# Patient Record
Sex: MALE | Race: WHITE | Employment: FULL TIME | URBAN - METROPOLITAN AREA
[De-identification: names, ages, dates, MRNs, and addresses within clinical notes are randomized per-mention and may not be internally consistent; named-entity substitution may affect disease eponyms.]

---

## 2018-12-07 ENCOUNTER — APPOINTMENT (OUTPATIENT)
Dept: GENERAL RADIOLOGY | Age: 68
End: 2018-12-07
Payer: COMMERCIAL

## 2018-12-07 ENCOUNTER — TELEPHONE (OUTPATIENT)
Dept: ORTHOPEDIC SURGERY | Age: 68
End: 2018-12-07

## 2018-12-07 ENCOUNTER — HOSPITAL ENCOUNTER (EMERGENCY)
Age: 68
Discharge: HOME | End: 2018-12-07
Attending: EMERGENCY MEDICINE
Payer: COMMERCIAL

## 2018-12-07 VITALS
SYSTOLIC BLOOD PRESSURE: 144 MMHG | WEIGHT: 257.72 LBS | TEMPERATURE: 97.4 F | HEART RATE: 71 BPM | BODY MASS INDEX: 36.08 KG/M2 | DIASTOLIC BLOOD PRESSURE: 84 MMHG | OXYGEN SATURATION: 100 % | HEIGHT: 71 IN | RESPIRATION RATE: 16 BRPM

## 2018-12-07 DIAGNOSIS — S86.812A RUPTURE OF LEFT PATELLAR TENDON, INITIAL ENCOUNTER: Primary | ICD-10-CM

## 2018-12-07 LAB
ANION GAP SERPL CALCULATED.3IONS-SCNC: 17 MMOL/L (ref 3–16)
BUN BLDV-MCNC: 9 MG/DL (ref 7–20)
CALCIUM SERPL-MCNC: 9 MG/DL (ref 8.3–10.6)
CHLORIDE BLD-SCNC: 100 MMOL/L (ref 99–110)
CO2: 23 MMOL/L (ref 21–32)
CREAT SERPL-MCNC: 0.9 MG/DL (ref 0.8–1.3)
GFR AFRICAN AMERICAN: >60
GFR NON-AFRICAN AMERICAN: >60
GLUCOSE BLD-MCNC: 120 MG/DL (ref 70–99)
HCT VFR BLD CALC: 45.1 % (ref 40.5–52.5)
HEMOGLOBIN: 15.9 G/DL (ref 13.5–17.5)
INR BLD: 1.09 (ref 0.86–1.14)
MCH RBC QN AUTO: 33.1 PG (ref 26–34)
MCHC RBC AUTO-ENTMCNC: 35.2 G/DL (ref 31–36)
MCV RBC AUTO: 94.2 FL (ref 80–100)
PDW BLD-RTO: 13 % (ref 12.4–15.4)
PLATELET # BLD: 159 K/UL (ref 135–450)
PMV BLD AUTO: 7.8 FL (ref 5–10.5)
POTASSIUM SERPL-SCNC: 3.3 MMOL/L (ref 3.5–5.1)
PROTHROMBIN TIME: 12.4 SEC (ref 9.8–13)
RBC # BLD: 4.79 M/UL (ref 4.2–5.9)
SODIUM BLD-SCNC: 140 MMOL/L (ref 136–145)
WBC # BLD: 5.3 K/UL (ref 4–11)

## 2018-12-07 PROCEDURE — 85027 COMPLETE CBC AUTOMATED: CPT

## 2018-12-07 PROCEDURE — 99283 EMERGENCY DEPT VISIT LOW MDM: CPT

## 2018-12-07 PROCEDURE — 73562 X-RAY EXAM OF KNEE 3: CPT

## 2018-12-07 PROCEDURE — 6360000002 HC RX W HCPCS

## 2018-12-07 PROCEDURE — 85610 PROTHROMBIN TIME: CPT

## 2018-12-07 PROCEDURE — 96374 THER/PROPH/DIAG INJ IV PUSH: CPT

## 2018-12-07 PROCEDURE — 80048 BASIC METABOLIC PNL TOTAL CA: CPT

## 2018-12-07 RX ORDER — MORPHINE SULFATE 4 MG/ML
4 INJECTION, SOLUTION INTRAMUSCULAR; INTRAVENOUS ONCE
Status: COMPLETED | OUTPATIENT
Start: 2018-12-07 | End: 2018-12-07

## 2018-12-07 RX ORDER — METAXALONE 400 MG/1
400 TABLET ORAL 2 TIMES DAILY
Qty: 20 TABLET | Refills: 0 | Status: SHIPPED | OUTPATIENT
Start: 2018-12-07

## 2018-12-07 RX ORDER — HYDROCODONE BITARTRATE AND ACETAMINOPHEN 5; 325 MG/1; MG/1
1 TABLET ORAL EVERY 6 HOURS PRN
Qty: 15 TABLET | Refills: 0 | Status: SHIPPED | OUTPATIENT
Start: 2018-12-07 | End: 2018-12-12

## 2018-12-07 RX ORDER — MORPHINE SULFATE 4 MG/ML
INJECTION, SOLUTION INTRAMUSCULAR; INTRAVENOUS
Status: COMPLETED
Start: 2018-12-07 | End: 2018-12-07

## 2018-12-07 RX ADMIN — MORPHINE SULFATE 4 MG: 4 INJECTION, SOLUTION INTRAMUSCULAR; INTRAVENOUS at 13:19

## 2018-12-07 RX ADMIN — MORPHINE SULFATE 4 MG: 4 INJECTION INTRAVENOUS at 13:19

## 2018-12-07 ASSESSMENT — PAIN SCALES - GENERAL
PAINLEVEL_OUTOF10: 5

## 2018-12-07 ASSESSMENT — ENCOUNTER SYMPTOMS
ABDOMINAL PAIN: 0
CHOKING: 0
EYE DISCHARGE: 0
NAUSEA: 0
APNEA: 0
VOMITING: 0
BACK PAIN: 0
EYE REDNESS: 0
FACIAL SWELLING: 0
SHORTNESS OF BREATH: 0

## 2018-12-07 NOTE — ED PROVIDER NOTES
systems were reviewed and negative. PAST MEDICAL HISTORY     Past Medical History:   Diagnosis Date    Hypertension     Thyroid disease          SURGICAL HISTORY   History reviewed. No pertinent surgical history. CURRENTMEDICATIONS       Previous Medications    No medications on file         ALLERGIES     Ampicillin and Bactrim [sulfamethoxazole-trimethoprim]    FAMILYHISTORY     History reviewed. No pertinent family history. SOCIAL HISTORY       Social History     Social History    Marital status:      Spouse name: N/A    Number of children: N/A    Years of education: N/A     Social History Main Topics    Smoking status: Never Smoker    Smokeless tobacco: None    Alcohol use Yes      Comment: socially    Drug use: Unknown    Sexual activity: Not Asked     Other Topics Concern    None     Social History Narrative    None       SCREENINGS             PHYSICAL EXAM    (up to 7 for level 4, 8 or more for level 5)     ED Triage Vitals [12/07/18 1249]   BP Temp Temp src Pulse Resp SpO2 Height Weight   137/79 97.4 °F (36.3 °C) -- 62 16 100 % 5' 11\" (1.803 m) 257 lb 11.5 oz (116.9 kg)       Physical Exam   Constitutional: He is oriented to person, place, and time. He appears well-developed and well-nourished. HENT:   Head: Normocephalic and atraumatic. Nose: Nose normal.   Eyes: Right eye exhibits no discharge. Left eye exhibits no discharge. Neck: Normal range of motion. Neck supple. No spinous process tenderness and no muscular tenderness present. Cardiovascular: Normal rate, regular rhythm and normal heart sounds. Exam reveals no gallop and no friction rub. No murmur heard. Pulmonary/Chest: Effort normal and breath sounds normal. No respiratory distress. He has no wheezes. He has no rales. He exhibits no tenderness. Abdominal: Soft. Bowel sounds are normal. He exhibits no distension and no mass. There is no tenderness. There is no rebound and no guarding. No hernia.

## 2018-12-11 ENCOUNTER — OFFICE VISIT (OUTPATIENT)
Dept: ORTHOPEDIC SURGERY | Age: 68
End: 2018-12-11
Payer: COMMERCIAL

## 2018-12-11 VITALS — BODY MASS INDEX: 35.7 KG/M2 | HEIGHT: 71 IN | WEIGHT: 255 LBS | RESPIRATION RATE: 16 BRPM

## 2018-12-11 DIAGNOSIS — S86.892A AVULSION OF LEFT PATELLAR TENDON, INITIAL ENCOUNTER: Primary | ICD-10-CM

## 2018-12-11 PROCEDURE — 99203 OFFICE O/P NEW LOW 30 MIN: CPT | Performed by: ORTHOPAEDIC SURGERY

## 2018-12-11 RX ORDER — LEVOTHYROXINE SODIUM 0.12 MG/1
TABLET ORAL
COMMUNITY
Start: 2018-08-01

## 2018-12-11 RX ORDER — LISINOPRIL AND HYDROCHLOROTHIAZIDE 25; 20 MG/1; MG/1
TABLET ORAL
COMMUNITY
Start: 2018-08-01

## 2018-12-11 RX ORDER — ATORVASTATIN CALCIUM 20 MG/1
20 TABLET, FILM COATED ORAL
COMMUNITY
Start: 2018-10-04

## 2018-12-11 RX ORDER — METHOCARBAMOL 750 MG/1
TABLET, FILM COATED ORAL
COMMUNITY
Start: 2018-12-07 | End: 2018-12-12

## 2018-12-11 NOTE — LETTER
All orders without checkboxes will processed automatically unless crossed out. Orders with checkboxes MUST be checked in order to be carried out. Sixto Jha                                                        1950  Date of Procedure/Surgery: 12/13/18  1. H & P for ALL procedure/surgery completed within 30 days, to be updated day of procedure/surgery  2. [ ]Consults: PT/OT evaluation  3. [X ]Defer to Anesthesia for Pre-Admission testing orders  4. Diagnostic Tests:  [ ]EKG (if not completed in last 3 months) IF: (check all that apply)   Other:  [ Jina Poisson (if not completed in last 6 months) IF: (check all that apply)   Hx Malignancy   Hx CVS/Thoracic Surg   CVS Disease   Hx Pneumonia last 3 months   Chronic Pulm Disease  Other:  [ ]Radiology   Knee Right Left Standing AP knee, hip to ankle on scoliosis film   Other:  5. Labs  [ ]Basic Metabolic Profile  IF: (check all that apply)  [ ]Albumin    Other:     __________________________________________________________________  [ ]CBC without diff   Pre op exam      __________________________________________________________________  [ ]PT/INR  PTT   Pre op exam         __________________________________________________________________  [ ]Type & Screen   Surg with potiential for signif.  blood loss  [ ]Type & cross match 2 units         __________________________________________________________________  [ ]Urine routine reflex to culture (UAR) If cultures positive, repeat on                  admission [ Orlena Current catch urine  [ ]Nasal culture for MRSA   [ ]Nasal MRSA culture  __________________________________________________________________  6. [ ]Other:      TO: ___________________________________________ Date: ____________ Time: _________    Physician Signature:            12/11/2018 10:42 AM                   Pre-operative Physician Prophylaxis Orders      Sixto Jha  1950    All orders without checkboxes will be processed automatically unless

## 2018-12-12 ENCOUNTER — ANESTHESIA EVENT (OUTPATIENT)
Dept: OPERATING ROOM | Age: 68
End: 2018-12-12
Payer: COMMERCIAL

## 2018-12-13 ENCOUNTER — HOSPITAL ENCOUNTER (OUTPATIENT)
Age: 68
Setting detail: OUTPATIENT SURGERY
Discharge: HOME | End: 2018-12-13
Attending: ORTHOPAEDIC SURGERY | Admitting: ORTHOPAEDIC SURGERY
Payer: COMMERCIAL

## 2018-12-13 ENCOUNTER — ANESTHESIA (OUTPATIENT)
Dept: OPERATING ROOM | Age: 68
End: 2018-12-13
Payer: COMMERCIAL

## 2018-12-13 VITALS
RESPIRATION RATE: 18 BRPM | SYSTOLIC BLOOD PRESSURE: 147 MMHG | BODY MASS INDEX: 35.7 KG/M2 | HEART RATE: 80 BPM | TEMPERATURE: 96.8 F | OXYGEN SATURATION: 95 % | HEIGHT: 71 IN | WEIGHT: 255 LBS | DIASTOLIC BLOOD PRESSURE: 83 MMHG

## 2018-12-13 VITALS
SYSTOLIC BLOOD PRESSURE: 129 MMHG | OXYGEN SATURATION: 98 % | TEMPERATURE: 96.8 F | RESPIRATION RATE: 10 BRPM | DIASTOLIC BLOOD PRESSURE: 73 MMHG

## 2018-12-13 DIAGNOSIS — S86.812A RUPTURED PATELLAR TENDON, LEFT, INITIAL ENCOUNTER: Primary | ICD-10-CM

## 2018-12-13 PROCEDURE — 7100000010 HC PHASE II RECOVERY - FIRST 15 MIN: Performed by: ORTHOPAEDIC SURGERY

## 2018-12-13 PROCEDURE — 6360000002 HC RX W HCPCS: Performed by: ANESTHESIOLOGY

## 2018-12-13 PROCEDURE — 3700000000 HC ANESTHESIA ATTENDED CARE: Performed by: ORTHOPAEDIC SURGERY

## 2018-12-13 PROCEDURE — 7100000000 HC PACU RECOVERY - FIRST 15 MIN: Performed by: ORTHOPAEDIC SURGERY

## 2018-12-13 PROCEDURE — 2580000003 HC RX 258: Performed by: ORTHOPAEDIC SURGERY

## 2018-12-13 PROCEDURE — 27380 REPAIR OF KNEECAP TENDON: CPT | Performed by: ORTHOPAEDIC SURGERY

## 2018-12-13 PROCEDURE — 2720000010 HC SURG SUPPLY STERILE: Performed by: ORTHOPAEDIC SURGERY

## 2018-12-13 PROCEDURE — 2500000003 HC RX 250 WO HCPCS: Performed by: NURSE ANESTHETIST, CERTIFIED REGISTERED

## 2018-12-13 PROCEDURE — 6360000002 HC RX W HCPCS: Performed by: NURSE ANESTHETIST, CERTIFIED REGISTERED

## 2018-12-13 PROCEDURE — 2709999900 HC NON-CHARGEABLE SUPPLY: Performed by: ORTHOPAEDIC SURGERY

## 2018-12-13 PROCEDURE — 2500000003 HC RX 250 WO HCPCS: Performed by: ORTHOPAEDIC SURGERY

## 2018-12-13 PROCEDURE — 2580000003 HC RX 258: Performed by: ANESTHESIOLOGY

## 2018-12-13 PROCEDURE — 7100000001 HC PACU RECOVERY - ADDTL 15 MIN: Performed by: ORTHOPAEDIC SURGERY

## 2018-12-13 PROCEDURE — 6370000000 HC RX 637 (ALT 250 FOR IP): Performed by: ANESTHESIOLOGY

## 2018-12-13 PROCEDURE — 3600000015 HC SURGERY LEVEL 5 ADDTL 15MIN: Performed by: ORTHOPAEDIC SURGERY

## 2018-12-13 PROCEDURE — 3700000001 HC ADD 15 MINUTES (ANESTHESIA): Performed by: ORTHOPAEDIC SURGERY

## 2018-12-13 PROCEDURE — 3600000005 HC SURGERY LEVEL 5 BASE: Performed by: ORTHOPAEDIC SURGERY

## 2018-12-13 PROCEDURE — 7100000011 HC PHASE II RECOVERY - ADDTL 15 MIN: Performed by: ORTHOPAEDIC SURGERY

## 2018-12-13 RX ORDER — ONDANSETRON 2 MG/ML
INJECTION INTRAMUSCULAR; INTRAVENOUS PRN
Status: DISCONTINUED | OUTPATIENT
Start: 2018-12-13 | End: 2018-12-13 | Stop reason: SDUPTHER

## 2018-12-13 RX ORDER — SODIUM CHLORIDE 0.9 % (FLUSH) 0.9 %
10 SYRINGE (ML) INJECTION EVERY 12 HOURS SCHEDULED
Status: DISCONTINUED | OUTPATIENT
Start: 2018-12-13 | End: 2018-12-13 | Stop reason: HOSPADM

## 2018-12-13 RX ORDER — BUPIVACAINE HYDROCHLORIDE AND EPINEPHRINE 2.5; 5 MG/ML; UG/ML
INJECTION, SOLUTION INFILTRATION; PERINEURAL
Status: COMPLETED | OUTPATIENT
Start: 2018-12-13 | End: 2018-12-13

## 2018-12-13 RX ORDER — DEXAMETHASONE SODIUM PHOSPHATE 4 MG/ML
INJECTION, SOLUTION INTRA-ARTICULAR; INTRALESIONAL; INTRAMUSCULAR; INTRAVENOUS; SOFT TISSUE PRN
Status: DISCONTINUED | OUTPATIENT
Start: 2018-12-13 | End: 2018-12-13 | Stop reason: SDUPTHER

## 2018-12-13 RX ORDER — MIDAZOLAM HYDROCHLORIDE 1 MG/ML
INJECTION INTRAMUSCULAR; INTRAVENOUS PRN
Status: DISCONTINUED | OUTPATIENT
Start: 2018-12-13 | End: 2018-12-13 | Stop reason: SDUPTHER

## 2018-12-13 RX ORDER — FENTANYL CITRATE 50 UG/ML
INJECTION, SOLUTION INTRAMUSCULAR; INTRAVENOUS PRN
Status: DISCONTINUED | OUTPATIENT
Start: 2018-12-13 | End: 2018-12-13 | Stop reason: SDUPTHER

## 2018-12-13 RX ORDER — SODIUM CHLORIDE 0.9 % (FLUSH) 0.9 %
10 SYRINGE (ML) INJECTION PRN
Status: DISCONTINUED | OUTPATIENT
Start: 2018-12-13 | End: 2018-12-13 | Stop reason: HOSPADM

## 2018-12-13 RX ORDER — MAGNESIUM HYDROXIDE 1200 MG/15ML
LIQUID ORAL CONTINUOUS PRN
Status: DISCONTINUED | OUTPATIENT
Start: 2018-12-13 | End: 2018-12-13 | Stop reason: HOSPADM

## 2018-12-13 RX ORDER — CLINDAMYCIN PHOSPHATE 900 MG/50ML
900 INJECTION INTRAVENOUS
Status: COMPLETED | OUTPATIENT
Start: 2018-12-13 | End: 2018-12-13

## 2018-12-13 RX ORDER — FENTANYL CITRATE 50 UG/ML
25 INJECTION, SOLUTION INTRAMUSCULAR; INTRAVENOUS EVERY 5 MIN PRN
Status: DISCONTINUED | OUTPATIENT
Start: 2018-12-13 | End: 2018-12-13 | Stop reason: HOSPADM

## 2018-12-13 RX ORDER — LIDOCAINE HYDROCHLORIDE 20 MG/ML
INJECTION, SOLUTION INFILTRATION; PERINEURAL PRN
Status: DISCONTINUED | OUTPATIENT
Start: 2018-12-13 | End: 2018-12-13 | Stop reason: SDUPTHER

## 2018-12-13 RX ORDER — KETOROLAC TROMETHAMINE 30 MG/ML
INJECTION, SOLUTION INTRAMUSCULAR; INTRAVENOUS PRN
Status: DISCONTINUED | OUTPATIENT
Start: 2018-12-13 | End: 2018-12-13 | Stop reason: SDUPTHER

## 2018-12-13 RX ORDER — FENTANYL CITRATE 50 UG/ML
50 INJECTION, SOLUTION INTRAMUSCULAR; INTRAVENOUS EVERY 5 MIN PRN
Status: DISCONTINUED | OUTPATIENT
Start: 2018-12-13 | End: 2018-12-13 | Stop reason: HOSPADM

## 2018-12-13 RX ORDER — ROCURONIUM BROMIDE 10 MG/ML
INJECTION, SOLUTION INTRAVENOUS PRN
Status: DISCONTINUED | OUTPATIENT
Start: 2018-12-13 | End: 2018-12-13 | Stop reason: SDUPTHER

## 2018-12-13 RX ORDER — OXYCODONE HYDROCHLORIDE AND ACETAMINOPHEN 5; 325 MG/1; MG/1
1 TABLET ORAL ONCE
Status: COMPLETED | OUTPATIENT
Start: 2018-12-13 | End: 2018-12-13

## 2018-12-13 RX ORDER — PROMETHAZINE HYDROCHLORIDE 25 MG/ML
6.25 INJECTION, SOLUTION INTRAMUSCULAR; INTRAVENOUS
Status: DISCONTINUED | OUTPATIENT
Start: 2018-12-13 | End: 2018-12-13 | Stop reason: HOSPADM

## 2018-12-13 RX ORDER — SODIUM CHLORIDE 9 MG/ML
INJECTION, SOLUTION INTRAVENOUS CONTINUOUS
Status: DISCONTINUED | OUTPATIENT
Start: 2018-12-13 | End: 2018-12-13 | Stop reason: HOSPADM

## 2018-12-13 RX ORDER — OXYCODONE HYDROCHLORIDE AND ACETAMINOPHEN 5; 325 MG/1; MG/1
1 TABLET ORAL EVERY 6 HOURS PRN
Qty: 28 TABLET | Refills: 0 | Status: SHIPPED | OUTPATIENT
Start: 2018-12-13 | End: 2018-12-20

## 2018-12-13 RX ORDER — PROPOFOL 10 MG/ML
INJECTION, EMULSION INTRAVENOUS PRN
Status: DISCONTINUED | OUTPATIENT
Start: 2018-12-13 | End: 2018-12-13 | Stop reason: SDUPTHER

## 2018-12-13 RX ORDER — ONDANSETRON 2 MG/ML
4 INJECTION INTRAMUSCULAR; INTRAVENOUS
Status: DISCONTINUED | OUTPATIENT
Start: 2018-12-13 | End: 2018-12-13 | Stop reason: HOSPADM

## 2018-12-13 RX ADMIN — PROPOFOL 200 MG: 10 INJECTION, EMULSION INTRAVENOUS at 07:31

## 2018-12-13 RX ADMIN — FENTANYL CITRATE 50 MCG: 50 INJECTION, SOLUTION INTRAMUSCULAR; INTRAVENOUS at 09:08

## 2018-12-13 RX ADMIN — FENTANYL CITRATE 50 MCG: 50 INJECTION, SOLUTION INTRAMUSCULAR; INTRAVENOUS at 09:00

## 2018-12-13 RX ADMIN — SUGAMMADEX 200 MG: 100 INJECTION, SOLUTION INTRAVENOUS at 08:45

## 2018-12-13 RX ADMIN — KETOROLAC TROMETHAMINE 15 MG: 30 INJECTION, SOLUTION INTRAMUSCULAR at 08:32

## 2018-12-13 RX ADMIN — DEXAMETHASONE SODIUM PHOSPHATE 10 MG: 4 INJECTION, SOLUTION INTRAMUSCULAR; INTRAVENOUS at 07:37

## 2018-12-13 RX ADMIN — PROPOFOL 30 MG: 10 INJECTION, EMULSION INTRAVENOUS at 08:41

## 2018-12-13 RX ADMIN — OXYCODONE AND ACETAMINOPHEN 1 TABLET: 5; 325 TABLET ORAL at 10:14

## 2018-12-13 RX ADMIN — FENTANYL CITRATE 100 MCG: 50 INJECTION INTRAMUSCULAR; INTRAVENOUS at 07:29

## 2018-12-13 RX ADMIN — CLINDAMYCIN PHOSPHATE 900 MG: 18 INJECTION, SOLUTION INTRAMUSCULAR; INTRAVENOUS at 07:22

## 2018-12-13 RX ADMIN — FENTANYL CITRATE 25 MCG: 50 INJECTION INTRAMUSCULAR; INTRAVENOUS at 08:42

## 2018-12-13 RX ADMIN — ROCURONIUM BROMIDE 50 MG: 10 INJECTION INTRAVENOUS at 07:31

## 2018-12-13 RX ADMIN — FENTANYL CITRATE 25 MCG: 50 INJECTION, SOLUTION INTRAMUSCULAR; INTRAVENOUS at 09:15

## 2018-12-13 RX ADMIN — FENTANYL CITRATE 25 MCG: 50 INJECTION INTRAMUSCULAR; INTRAVENOUS at 08:50

## 2018-12-13 RX ADMIN — SODIUM CHLORIDE: 9 INJECTION, SOLUTION INTRAVENOUS at 06:45

## 2018-12-13 RX ADMIN — FENTANYL CITRATE 50 MCG: 50 INJECTION INTRAMUSCULAR; INTRAVENOUS at 08:00

## 2018-12-13 RX ADMIN — LIDOCAINE HYDROCHLORIDE 100 MG: 20 INJECTION, SOLUTION INFILTRATION; PERINEURAL at 07:31

## 2018-12-13 RX ADMIN — MIDAZOLAM 2 MG: 1 INJECTION INTRAMUSCULAR; INTRAVENOUS at 07:22

## 2018-12-13 RX ADMIN — ONDANSETRON 4 MG: 2 INJECTION INTRAMUSCULAR; INTRAVENOUS at 08:32

## 2018-12-13 ASSESSMENT — PULMONARY FUNCTION TESTS
PIF_VALUE: 18
PIF_VALUE: 17
PIF_VALUE: 13
PIF_VALUE: 18
PIF_VALUE: 18
PIF_VALUE: 5
PIF_VALUE: 13
PIF_VALUE: 1
PIF_VALUE: 18
PIF_VALUE: 18
PIF_VALUE: 10
PIF_VALUE: 17
PIF_VALUE: 7
PIF_VALUE: 18
PIF_VALUE: 1
PIF_VALUE: 13
PIF_VALUE: 1
PIF_VALUE: 18
PIF_VALUE: 26
PIF_VALUE: 21
PIF_VALUE: 18
PIF_VALUE: 17
PIF_VALUE: 21
PIF_VALUE: 18
PIF_VALUE: 1
PIF_VALUE: 17
PIF_VALUE: 18
PIF_VALUE: 13
PIF_VALUE: 18
PIF_VALUE: 17
PIF_VALUE: 18
PIF_VALUE: 18
PIF_VALUE: 1
PIF_VALUE: 17
PIF_VALUE: 3
PIF_VALUE: 5
PIF_VALUE: 18
PIF_VALUE: 18
PIF_VALUE: 17
PIF_VALUE: 17
PIF_VALUE: 1
PIF_VALUE: 18
PIF_VALUE: 18
PIF_VALUE: 17
PIF_VALUE: 3
PIF_VALUE: 18
PIF_VALUE: 18
PIF_VALUE: 1
PIF_VALUE: 18
PIF_VALUE: 17
PIF_VALUE: 17
PIF_VALUE: 0
PIF_VALUE: 4
PIF_VALUE: 18
PIF_VALUE: 18
PIF_VALUE: 17
PIF_VALUE: 1
PIF_VALUE: 18
PIF_VALUE: 17
PIF_VALUE: 18
PIF_VALUE: 17
PIF_VALUE: 13
PIF_VALUE: 1
PIF_VALUE: 19
PIF_VALUE: 18
PIF_VALUE: 1
PIF_VALUE: 17
PIF_VALUE: 13
PIF_VALUE: 0
PIF_VALUE: 22
PIF_VALUE: 18
PIF_VALUE: 13
PIF_VALUE: 13
PIF_VALUE: 18
PIF_VALUE: 5
PIF_VALUE: 17
PIF_VALUE: 17
PIF_VALUE: 4
PIF_VALUE: 17
PIF_VALUE: 18
PIF_VALUE: 17
PIF_VALUE: 18
PIF_VALUE: 8
PIF_VALUE: 17
PIF_VALUE: 18
PIF_VALUE: 7
PIF_VALUE: 18

## 2018-12-13 ASSESSMENT — PAIN SCALES - GENERAL
PAINLEVEL_OUTOF10: 5
PAINLEVEL_OUTOF10: 8

## 2018-12-13 ASSESSMENT — PAIN - FUNCTIONAL ASSESSMENT: PAIN_FUNCTIONAL_ASSESSMENT: 0-10

## 2018-12-13 ASSESSMENT — ENCOUNTER SYMPTOMS: SHORTNESS OF BREATH: 0

## 2018-12-13 NOTE — OP NOTE
[address] Sharron Gallego                                 OPERATIVE REPORT    PATIENT NAME: Lynda Fernández                      :        1950  MED REC NO:   [id number]                          ROOM:  ACCOUNT NO:   [de-identified]                           ADMIT DATE: 2018  PROVIDER:     Mahesh Benites MD    DATE OF PROCEDURE:  2018    PREOPERATIVE DIAGNOSIS:  Left patellar tendon rupture. POSTOPERATIVE DIAGNOSIS:  Left patellar tendon rupture. OPERATION PERFORMED:  Primary repair of the left patellar tendon. SURGEON:  Mahesh Benites MD    ASSISTANT:  Kenney Ndiaye. TOURNIQUET TIME:  36 minutes at 250 mmHg. BLOOD LOSS:  Minimal.    COMPLICATIONS:  None. INDICATIONS:  The patient is a 53-year-old male who had a fall and  ruptured his left patellar tendon. He had complete destruction of his  extensor mechanism. I recommended surgical treatment. Please refer to  my preoperative note for full details of our discussion. OPERATIVE PROCEDURE:  The patient was brought back to the OR and  transferred onto operating room table. General anesthesia was  administered, and a nonsterile thigh tourniquet was applied. The entire  left lower extremity was subsequently prepped and draped in the usual  sterile fashion. A full time-out was performed with all parties in  agreement, and the patient did receive clindamycin 900 mg for  preoperative IV antibiotics. The left lower extremity was exsanguinated and the tourniquet was then  inflated to 250 mmHg. I made a midline knee incision and elevated  medial and lateral skin flaps. There was immediate expression of  clotted hematoma consistent with his rupture. The retinaculum medially  and laterally were torn as well. This was more of an intrasubstance  tear than a direct avulsion completely off the inferior pole of the  patella.   The remaining tissue of the

## 2018-12-13 NOTE — ANESTHESIA PRE PROCEDURE
Substance Use Topics    Smoking status: Former Smoker     Quit date: 12/12/1983    Smokeless tobacco: Never Used    Alcohol use Yes      Comment: socially                                Counseling given: Not Answered      Vital Signs (Current):   Vitals:    12/13/18 0628   BP: (!) 144/94   Pulse: 89   Resp: 16   Temp: 98 °F (36.7 °C)   TempSrc: Oral   SpO2: 98%   Weight: 255 lb (115.7 kg)   Height: 5' 11\" (1.803 m)                                              BP Readings from Last 3 Encounters:   12/13/18 (!) 144/94   12/07/18 (!) 144/84       NPO Status: Time of last liquid consumption: 1900                        Time of last solid consumption: 1900                        Date of last liquid consumption: 12/12/18                        Date of last solid food consumption: 12/12/18    BMI:   Wt Readings from Last 3 Encounters:   12/13/18 255 lb (115.7 kg)   12/11/18 255 lb (115.7 kg)   12/07/18 257 lb 11.5 oz (116.9 kg)     Body mass index is 35.57 kg/m². CBC:   Lab Results   Component Value Date    WBC 5.3 12/07/2018    RBC 4.79 12/07/2018    HGB 15.9 12/07/2018    HCT 45.1 12/07/2018    MCV 94.2 12/07/2018    RDW 13.0 12/07/2018     12/07/2018       CMP:   Lab Results   Component Value Date     12/07/2018    K 3.3 12/07/2018     12/07/2018    CO2 23 12/07/2018    BUN 9 12/07/2018    CREATININE 0.9 12/07/2018    GFRAA >60 12/07/2018    LABGLOM >60 12/07/2018    GLUCOSE 120 12/07/2018    CALCIUM 9.0 12/07/2018       POC Tests: No results for input(s): POCGLU, POCNA, POCK, POCCL, POCBUN, POCHEMO, POCHCT in the last 72 hours.     Coags:   Lab Results   Component Value Date    PROTIME 12.4 12/07/2018    INR 1.09 12/07/2018       HCG (If Applicable): No results found for: PREGTESTUR, PREGSERUM, HCG, HCGQUANT     ABGs: No results found for: PHART, PO2ART, UQC5PLJ, JEN7DHY, BEART, K0HBIWJJ     Type & Screen (If Applicable):  No results found for: LABABO, [address]    Anesthesia Evaluation  Patient

## 2018-12-27 ENCOUNTER — OFFICE VISIT (OUTPATIENT)
Dept: ORTHOPEDIC SURGERY | Age: 68
End: 2018-12-27
Payer: COMMERCIAL

## 2018-12-27 VITALS
WEIGHT: 243 LBS | HEIGHT: 71 IN | DIASTOLIC BLOOD PRESSURE: 84 MMHG | BODY MASS INDEX: 34.02 KG/M2 | SYSTOLIC BLOOD PRESSURE: 116 MMHG | HEART RATE: 91 BPM

## 2018-12-27 DIAGNOSIS — S86.812A RUPTURED PATELLAR TENDON, LEFT, INITIAL ENCOUNTER: Primary | ICD-10-CM

## 2018-12-27 PROCEDURE — L1832 KO ADJ JNT POS R SUP PRE CST: HCPCS | Performed by: ORTHOPAEDIC SURGERY

## 2018-12-27 PROCEDURE — 99024 POSTOP FOLLOW-UP VISIT: CPT | Performed by: ORTHOPAEDIC SURGERY

## 2018-12-28 ENCOUNTER — TELEPHONE (OUTPATIENT)
Dept: ORTHOPEDIC SURGERY | Age: 68
End: 2018-12-28

## 2019-01-07 ENCOUNTER — TELEPHONE (OUTPATIENT)
Dept: ORTHOPEDIC SURGERY | Age: 69
End: 2019-01-07

## 2019-01-09 ENCOUNTER — TELEPHONE (OUTPATIENT)
Dept: ORTHOPEDIC SURGERY | Age: 69
End: 2019-01-09

## 2019-01-11 ENCOUNTER — TELEPHONE (OUTPATIENT)
Dept: ORTHOPEDIC SURGERY | Age: 69
End: 2019-01-11

## 2019-01-29 ENCOUNTER — OFFICE VISIT (OUTPATIENT)
Dept: ORTHOPEDIC SURGERY | Age: 69
End: 2019-01-29

## 2019-01-29 VITALS — RESPIRATION RATE: 16 BRPM | BODY MASS INDEX: 34.02 KG/M2 | HEIGHT: 71 IN | WEIGHT: 243 LBS

## 2019-01-29 DIAGNOSIS — S86.812A RUPTURED PATELLAR TENDON, LEFT, INITIAL ENCOUNTER: Primary | ICD-10-CM

## 2019-01-29 PROCEDURE — 99024 POSTOP FOLLOW-UP VISIT: CPT | Performed by: ORTHOPAEDIC SURGERY

## 2019-02-04 ENCOUNTER — HOSPITAL ENCOUNTER (OUTPATIENT)
Dept: PHYSICAL THERAPY | Age: 69
Setting detail: THERAPIES SERIES
Discharge: HOME | End: 2019-02-04
Payer: COMMERCIAL

## 2019-02-04 PROCEDURE — 97530 THERAPEUTIC ACTIVITIES: CPT

## 2019-02-04 PROCEDURE — 97161 PT EVAL LOW COMPLEX 20 MIN: CPT

## 2019-02-04 PROCEDURE — 97110 THERAPEUTIC EXERCISES: CPT

## 2019-02-04 ASSESSMENT — PAIN DESCRIPTION - LOCATION: LOCATION: KNEE

## 2019-02-04 ASSESSMENT — PAIN DESCRIPTION - ORIENTATION: ORIENTATION: LEFT

## 2019-02-04 ASSESSMENT — PAIN DESCRIPTION - DESCRIPTORS: DESCRIPTORS: ACHING

## 2019-02-04 ASSESSMENT — PAIN - FUNCTIONAL ASSESSMENT: PAIN_FUNCTIONAL_ASSESSMENT: PREVENTS OR INTERFERES SOME ACTIVE ACTIVITIES AND ADLS

## 2019-02-07 ENCOUNTER — HOSPITAL ENCOUNTER (OUTPATIENT)
Dept: PHYSICAL THERAPY | Age: 69
Setting detail: THERAPIES SERIES
Discharge: HOME | End: 2019-02-07
Payer: COMMERCIAL

## 2019-02-07 PROCEDURE — 97110 THERAPEUTIC EXERCISES: CPT

## 2019-02-11 ENCOUNTER — HOSPITAL ENCOUNTER (OUTPATIENT)
Dept: PHYSICAL THERAPY | Age: 69
Setting detail: THERAPIES SERIES
Discharge: HOME | End: 2019-02-11
Payer: COMMERCIAL

## 2019-02-11 PROCEDURE — 97110 THERAPEUTIC EXERCISES: CPT

## 2019-02-11 PROCEDURE — G0283 ELEC STIM OTHER THAN WOUND: HCPCS

## 2019-02-12 ENCOUNTER — TELEPHONE (OUTPATIENT)
Dept: ORTHOPEDIC SURGERY | Age: 69
End: 2019-02-12

## 2019-02-14 ENCOUNTER — HOSPITAL ENCOUNTER (OUTPATIENT)
Dept: PHYSICAL THERAPY | Age: 69
Setting detail: THERAPIES SERIES
Discharge: HOME | End: 2019-02-14
Payer: COMMERCIAL

## 2019-02-14 PROCEDURE — 97112 NEUROMUSCULAR REEDUCATION: CPT

## 2019-02-14 PROCEDURE — 97110 THERAPEUTIC EXERCISES: CPT

## 2019-02-15 ENCOUNTER — TELEPHONE (OUTPATIENT)
Dept: ORTHOPEDIC SURGERY | Age: 69
End: 2019-02-15

## 2019-02-18 ENCOUNTER — HOSPITAL ENCOUNTER (OUTPATIENT)
Dept: PHYSICAL THERAPY | Age: 69
Setting detail: THERAPIES SERIES
Discharge: HOME | End: 2019-02-18
Payer: COMMERCIAL

## 2019-02-18 PROCEDURE — 97110 THERAPEUTIC EXERCISES: CPT

## 2019-02-18 PROCEDURE — G0283 ELEC STIM OTHER THAN WOUND: HCPCS

## 2019-02-21 ENCOUNTER — HOSPITAL ENCOUNTER (OUTPATIENT)
Dept: PHYSICAL THERAPY | Age: 69
Setting detail: THERAPIES SERIES
Discharge: HOME | End: 2019-02-21
Payer: COMMERCIAL

## 2019-02-21 PROCEDURE — 97110 THERAPEUTIC EXERCISES: CPT

## 2019-02-21 PROCEDURE — 97112 NEUROMUSCULAR REEDUCATION: CPT

## 2019-02-25 ENCOUNTER — HOSPITAL ENCOUNTER (OUTPATIENT)
Dept: PHYSICAL THERAPY | Age: 69
Setting detail: THERAPIES SERIES
Discharge: HOME | End: 2019-02-25
Payer: COMMERCIAL

## 2019-02-25 PROCEDURE — 97110 THERAPEUTIC EXERCISES: CPT

## 2019-02-28 ENCOUNTER — HOSPITAL ENCOUNTER (OUTPATIENT)
Dept: PHYSICAL THERAPY | Age: 69
Setting detail: THERAPIES SERIES
Discharge: HOME | End: 2019-02-28
Payer: COMMERCIAL

## 2019-02-28 PROCEDURE — 97110 THERAPEUTIC EXERCISES: CPT

## 2019-03-04 ENCOUNTER — HOSPITAL ENCOUNTER (OUTPATIENT)
Dept: PHYSICAL THERAPY | Age: 69
Setting detail: THERAPIES SERIES
Discharge: HOME | End: 2019-03-04
Payer: COMMERCIAL

## 2019-03-04 PROCEDURE — 97110 THERAPEUTIC EXERCISES: CPT

## 2019-03-07 ENCOUNTER — HOSPITAL ENCOUNTER (OUTPATIENT)
Dept: PHYSICAL THERAPY | Age: 69
Setting detail: THERAPIES SERIES
Discharge: HOME | End: 2019-03-07
Payer: COMMERCIAL

## 2019-03-07 PROCEDURE — 97110 THERAPEUTIC EXERCISES: CPT

## 2019-03-11 ENCOUNTER — HOSPITAL ENCOUNTER (OUTPATIENT)
Dept: PHYSICAL THERAPY | Age: 69
Setting detail: THERAPIES SERIES
Discharge: HOME | End: 2019-03-11
Payer: COMMERCIAL

## 2019-03-11 PROCEDURE — 97110 THERAPEUTIC EXERCISES: CPT

## 2019-03-14 ENCOUNTER — HOSPITAL ENCOUNTER (OUTPATIENT)
Dept: PHYSICAL THERAPY | Age: 69
Setting detail: THERAPIES SERIES
Discharge: HOME | End: 2019-03-14
Payer: COMMERCIAL

## 2019-03-14 NOTE — FLOWSHEET NOTE
Physical Therapy  Cancellation/No-show Note  Patient Name:  Alina Harkins  :  1950   Date:  3/14/2019  Cancelled visits to date: 1  No-shows to date: 0    For today's appointment patient:  [x]  Cancelled  []  Rescheduled appointment  []  No-show     Reason given by patient:  []  Patient ill  []  Conflicting appointment  []  No transportation    []  Conflict with work  []  No reason given  [x]  Other:  Wife's having car trouble   Comments:      Electronically signed by:  Chelsey Mir PTA

## 2019-03-18 ENCOUNTER — HOSPITAL ENCOUNTER (OUTPATIENT)
Dept: PHYSICAL THERAPY | Age: 69
Setting detail: THERAPIES SERIES
Discharge: HOME | End: 2019-03-18
Payer: COMMERCIAL

## 2019-03-18 PROCEDURE — 97110 THERAPEUTIC EXERCISES: CPT | Performed by: CHIROPRACTOR

## 2019-03-25 ENCOUNTER — HOSPITAL ENCOUNTER (OUTPATIENT)
Dept: PHYSICAL THERAPY | Age: 69
Setting detail: THERAPIES SERIES
Discharge: HOME | End: 2019-03-25
Payer: COMMERCIAL

## 2019-03-25 PROCEDURE — 97110 THERAPEUTIC EXERCISES: CPT

## 2019-04-01 ENCOUNTER — HOSPITAL ENCOUNTER (OUTPATIENT)
Dept: PHYSICAL THERAPY | Age: 69
Setting detail: THERAPIES SERIES
Discharge: HOME | End: 2019-04-01
Payer: COMMERCIAL

## 2019-04-01 PROCEDURE — 97110 THERAPEUTIC EXERCISES: CPT

## 2019-04-01 NOTE — FLOWSHEET NOTE
Physical Therapy Daily Treatment Note  Date:  2019    Patient Name:  Jaspal Higuera    :  1950  MRN: [id number]    Restrictions/Precautions: Fall Risk (low: pt denies any history of frequent falls. )    Pertinent Medical History: Additional Pertinent Hx: HTN    Medical/Treatment Diagnosis Information:  · Diagnosis: L patellar tendon rupture with repair  · Treatment Diagnosis: Decreased mobility, strength, gait    Insurance/Certification information:  PT Insurance Information: [employer] - 27 PCY  Physician Information:  Referring Practitioner: Arturo Henry MD  Plan of care signed (Y/N): sent for cosign  (received)    Visit# / total visits:     Pain level: 0-1/10     Functional Outcomes Measure: at eval   Test: LEFS  Score: 39    Progress Note: []  Yes  [x]  No  Next due by: Visit #10      History of Injury: Patient slipped and fell putting knee back into hyperflexion tearing patellar tendon. Patient underwent repair on  without complications. 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 Patient is currently wearing knee brace at all times when out of the house. States he sometimes takes brace off at home and walks around. Does not use any AD for ambulation. Stairs are still difficult to do particularly down more than up. Doing stairs one at a time. Subjective:  Notes having to work Saturday night and being up on feet all night moving around. States he did fine with this and has no increased discomfort. Has been able to do HEP and fitness center workout without any issues. Objective:  Observation: Updated 19  · Palpation: no TTP inferior patellar tendon at attachment. · Observation: Patient ambulates to PT without brace demonstrating good heel - toe pattern without limp or AD.    · Edema:min L knee  · Scar: incision healed with no signs of infection  Test measurements:     ROM:  Date           Knee Flexion       Knee Extension    Active Passive Active Passive    Eval 2/4 90 95 0 0   3/11/19 128 129 0 0     Strength:  Date Hip flexion Hip abduction Hip adduction Hip extension Quad Hamstring Ankle DF Ankle PF   Eval 2/4 4/5 5/5 4+/5 4/5 3/5 4/5 5/5 5/5   3/11 5/5 5/5 5/5 5/5 4/5 5/5 5/5 5/5   4/1/19     4+/5        Exercises:  Exercise/Equipment Resistance/Repetitions Other comments        Nu step seat 9  L1 x 5 min     HS step stretch 3 x 20 sec     Knee flexion step stretch 5 x 10 sec    gastroc incline 3 x 20 sec     SL HR 20x L    Mini squats  SL squats 10x  5x L - minimal range    Tband TKE Green 15x L     Step up fwd                Lateral 8\" 10x L  8\" 10x L    Step down 2\" 12x L    Fitter f/b 1 thick x10    Rockerboard f/b, s/s 10x each with slight squat    Bosu: balance            Squats            Step ups x30 sec   10x  10x L    Wall sits  3x 15 sec holds         Leg Press 80# 3x10 B  40# 3x10 L     MHE flex          abd          ext 20# 15x L/R  20# 15x L/R  20# 15x L/R    Seated HS curls 22# 3x10 L    Seated FAQ 90-60 11# 3x10 L         Seated eccentric quad 1x10 L Increase reps    LAQ 2# 2x10 L Full range        SAQ  3# 3x10 L         Knee PROM flexion 5x  To tolerance   Patellar mobs all directions Grade III 5x each           CP after exercise x10 mins L knee        Other Therapeutic Activities:  Pt was educated on PT POC, Diagnosis, Prognosis, pathomechanics as well as frequency and duration of scheduling future physical therapy appointments. Time was also taken on this day to answer all patient questions and participation in PT. Reviewed appointment policy in detail with patient and patient verbalized understanding. Discussed at length with patient tissue healing time and activity restrictions at this time. Patient advised he is not ready for return to gym workouts at this time. Also reviewed brace use and gradual weaning off of brace.     2/11/19: left message with MD concerning: when brace can be d/c, when knee extension AROM may begin, and when quad resistive exercises may begin. 2/18/19: Received call back from Marilee at Dr. Debbie Mahajan office: Laron Bee out of brace. Ok to do knee extension AROM and quad resistive exercises. 3/11/19: Re-eval measurements taken for MD PN. Discussed remaining deficits and continuation of skilled PT at this time. Patient agreeable. 4/1/19: Re-eval measurements taken for MD PN. Discussed remaining deficits and continuation of skilled PT at this time. Patient agreeable. Home Exercise Program:  Patient instructed in the following for HEP: SLR all directions, long sit HS stretch, and heelslides. Patient verbalized/demonstrated understanding and was issued written handout. 2/18/19: Patient instructed to add knee extension AAROM exercise to HEP. Pt verbalized understanding. 3/4/19: Patient advised that he may begin the following at the gym at work: seated hip abd/add, seated HS curl, and elliptical at a walking pace. Patient advised not to start leg extension or leg press machines. Patient verbalized understanding. 3/11/19: Discussed with patient adding leg press machine to gym workout after Thursday's visit if no increase in pain after performing in clinic. Patient advised to do light weight more reps and type of leg press machine that is appropriate for use. Patient verbalized understanding. 3/25/19: Patient issued updated HEP and fitness center routine listing proper equipment and weights. Patient also given green tband for HEP. Timed Code Treatment Minutes:  50    Total Treatment Minutes:  60    Assessment:  [x] Patient tolerated treatment well [] Patient limited by fatigue  [] Patient limited by pain  [] Patient limited by other medical complications  [x] Other: increased tolerance to being up on feet for prolonged periods noted.        Prognosis: [x] Good [] Fair  [] Poor    Goals:    Short term goals  Time Frame for Short term goals: 3 weeks  Short term goal 1: Patient will be independent with HEP for prevention of reinjury. 3/11 MET  Short term goal 2: Patient will report 30% improvement with pain for ease with ADLs. 3/25 MET  Short term goal 3: Increase knee AROM to 0-110 for ease with transfers. 3/11 MET     Long term goals  Time Frame for Long term goals : at discharge  Long term goal 1: Increase knee AROM to 0-120 for ease with transfers. 3/11 MET  Long term goal 2: Increase LE strength to 4+/5 for ease with stairs. 3/11 NOT MET  Long term goal 3: Patient will ambulate in community without AD and demonstrate good gait pattern. 3/11 MET  Long term goal 4: Patient will report 70% improvement with pain for ease with ADLs.  3/25 MET     Patient Requires Follow-up: [x] Yes  [] No    Plan:   [x] Continue per plan of care [] Alter current plan (see comments)   [] Plan of care initiated [] Hold pending MD visit [] Discharge    Plan for Next Session:  Add above as stated.      Electronically signed by:  Cassidy Velasquez, [id number] Jeet Daniels

## 2019-04-01 NOTE — PROGRESS NOTES
Outpatient Physical Therapy  [x] [facility]    Phone: [phone]   Fax: [phone]   [] [facility]  Phone: [phone]   Fax: [phone]  [] [facility]              Phone: [phone]   Fax: [phone]     Physical Therapy Progress/Discharge Note  Date: 2019        Patient Name:  Rex Franz    :  1950  MRN: [id number]    Diagnosis:  L patellar tendon rupture with repair  Treatment Diagnosis:  Decreased mobility, strength, gait    Insurance/Certification information:  [employer] Choice  Referring Physician:  Yvette Murcia MD    Visit# / total visits:  12  Pain level: 0-1/10     Functional Outcomes Measure: at eval   Test: LEFS  Score: 39    Plan of Care/Treatment to date:  [x] Therapeutic Exercise    [x] Modalities:  [x] Therapeutic Activity     [] Ultrasound  [] Electrical Stimulation  [x] Gait Training      [] Cervical Traction    [] Lumbar Traction  [] Neuromuscular Re-education  [x] Cold/hotpack [] Iontophoresis  [x] Instruction in HEP      Other:  [x] Manual Therapy       []    [] Aquatic Therapy       []                    ? Significant Findings At Last Visit/Comments:    Subjective:     Steps are still a little sore going down but slowly improving. Patient reports 90% improvement with pain overall. Objective:  · Observation:Updated 19  § Palpation: no TTP inferior patellar tendon at attachment. § Observation: Patient ambulates to PT without brace demonstrating good heel - toe pattern without limp or AD.    § Edema:min L knee  § Scar: incision healed with no signs of infection  · Test measurements:     ROM:  Date                         Knee Flexion          Knee Extension     Active Passive Active Passive    Eval  90 95 0 0   3/11/19 128 129 0 0      Strength:  Date Hip flexion Hip abduction Hip adduction Hip extension Quad Hamstring Ankle DF Ankle PF   Eval  4/ 5/5 4+/5 4/5 3/ 4/5 5/5 5/5   3/11 5/5 5/5 5/5 5/5 4/5 5/5 5/5 5/5   19         4+/5           Assessment:  Summary: Patient continues to progress with quad strengthening as planned. Eccentric strength is still lacking but improving. Would like to continue PT to progress towards HEP. Patient's response to treatment: tolerating well. Progress towards goals:    Goals:    Short term goals  Time Frame for Short term goals: 3 weeks  Short term goal 1: Patient will be independent with HEP for prevention of reinjury. 3/11 MET  Short term goal 2: Patient will report 30% improvement with pain for ease with ADLs. 3/25 MET  Short term goal 3: Increase knee AROM to 0-110 for ease with transfers. 3/11 MET     Long term goals  Time Frame for Long term goals : at discharge  Long term goal 1: Increase knee AROM to 0-120 for ease with transfers. 3/11 MET  Long term goal 2: Increase LE strength to 4+/5 for ease with stairs. 3/11 NOT MET  Long term goal 3: Patient will ambulate in community without AD and demonstrate good gait pattern. 3/11 MET  Long term goal 4: Patient will report 70% improvement with pain for ease with ADLs.  3/25 MET      Current Frequency/Duration:  # Days per week: [x] 1 day # Weeks: [] 1 week [x] 4 weeks      [x] 2 days? [] 2 weeks [] 5 weeks      [] 3 days   [] 3 weeks [] 6 weeks     Rehab Potential: [x] Excellent [] Good [] Fair  [] Poor     Goal Status:  [] Achieved [x] Partially Achieved  [] Not Achieved     Patient Status: [] Continue per initial plan of Care     [] Patient now discharged     [x] Additional visits requested, Please re-certify for additional visits:      Requested frequency/duration:  1x/week for 4 weeks    Electronically signed by:  Paul Diaz [id number] Jeet Daniels    If you have any questions or concerns, please don't hesitate to call.   Thank you for your referral.    Physician Signature:________________________________Date:__________________  By signing above, therapists plan is approved by physician

## 2019-04-02 ENCOUNTER — OFFICE VISIT (OUTPATIENT)
Dept: ORTHOPEDIC SURGERY | Age: 69
End: 2019-04-02
Payer: COMMERCIAL

## 2019-04-02 VITALS
WEIGHT: 255 LBS | SYSTOLIC BLOOD PRESSURE: 134 MMHG | HEIGHT: 71 IN | DIASTOLIC BLOOD PRESSURE: 80 MMHG | BODY MASS INDEX: 35.7 KG/M2 | HEART RATE: 70 BPM

## 2019-04-02 DIAGNOSIS — S86.812D: Primary | ICD-10-CM

## 2019-04-02 PROCEDURE — 99213 OFFICE O/P EST LOW 20 MIN: CPT | Performed by: ORTHOPAEDIC SURGERY

## 2019-04-02 NOTE — LETTER
[facility]  [address] [facility]  [address] [facility] [address]  Phone: [phone]  Fax: [phone]    Keturah Presley MD        April 2, 2019     Patient: Danilo Avilez   YOB: 1950   Date of Visit: 4/2/2019       To Whom It May Concern: It is my medical opinion that Danilo Avilez may return to full duty immediately with no restrictions, may resume driving and carrying luggage. If you have any questions or concerns, please don't hesitate to call.     Sincerely,          Keturah Presley MD

## 2019-04-02 NOTE — PROGRESS NOTES
ORTHOPAEDIC PROGRESS NOTE    Chief Complaint   Patient presents with    Follow-up     left knee DOS 12/13/2018       HPI   4/2/2019  Just over 3.5 months postop  He is doing well  He is in PT  He is back to work (desk job, works at [employer], and also does executive transport)  He rates pain 1/10  Still some difficulty with reciprocating stairs  No wound issues  Denies N/T    1/29/2019  6 weeks postop  Doing well  No pain  Using brace 0 - 50 now  Back to work    12/27/2018  DOS 12/13/2018  First postop visit, 2 weeks postop  He is doing well  Denies wound problems  Denies N/T  Pain well controlled      Vitals:    04/02/19 0817   BP: 134/80   Pulse: 70   Weight: 255 lb (115.7 kg)   Height: 5' 11\" (1.803 m)       Physical Exam  Left knee - mild effusion   Midline incision fully healed, c/d/i   ROM 0 - 110 this AM   No lag with SLR against gravity  SILT SP/DP/T/LFC nerve distributions; EHL/TA/GS/quad intact      Imaging:  None    Assessment & Plan:  79 y.o. male following up for   Diagnosis Orders   1. Ruptured patellar tendon, left, subsequent encounter         No orders of the defined types were placed in this encounter.     he is doing well  Activities as tolerated  Full return to work letter given  FU TORRI Martin

## 2019-04-08 ENCOUNTER — HOSPITAL ENCOUNTER (OUTPATIENT)
Dept: PHYSICAL THERAPY | Age: 69
Setting detail: THERAPIES SERIES
Discharge: HOME | End: 2019-04-08
Payer: COMMERCIAL

## 2019-04-08 PROCEDURE — 97110 THERAPEUTIC EXERCISES: CPT

## 2019-04-08 NOTE — FLOWSHEET NOTE
Physical Therapy Daily Treatment Note  Date:  2019    Patient Name:  Miller Ashley    :  1950  MRN: [id number]    Restrictions/Precautions: Fall Risk (low: pt denies any history of frequent falls. )    Pertinent Medical History: Additional Pertinent Hx: HTN    Medical/Treatment Diagnosis Information:  · Diagnosis: L patellar tendon rupture with repair  · Treatment Diagnosis: Decreased mobility, strength, gait    Insurance/Certification information:  PT Insurance Information: [employer] - 27 PCY  Physician Information:  Referring Practitioner: Mackenzie Joyner MD  Plan of care signed (Y/N): sent for cosign  (received), PN sent 3/25 (received)    Visit# / total visits:    Pain level: 2/10     Functional Outcomes Measure: at eval   Test: LEFS  Score: 54    Progress Note: []  Yes  [x]  No  Next due by: Visit #10      History of Injury: Patient slipped and fell putting knee back into hyperflexion tearing patellar tendon. Patient underwent repair on  without complications. 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 Patient is currently wearing knee brace at all times when out of the house. States he sometimes takes brace off at home and walks around. Does not use any AD for ambulation. Stairs are still difficult to do particularly down more than up. Doing stairs one at a time. Subjective:  Continues to do leg workouts in gym at work without any issues. Saw MD last week who gave him the clear to continue on his own. Objective:  Observation: Updated 19  · Palpation: no TTP inferior patellar tendon at attachment. · Observation: Patient ambulates to PT without brace demonstrating good heel - toe pattern without limp or AD.    · Edema:min L knee  · Scar: incision healed with no signs of infection  Test measurements:     ROM:  Date           Knee Flexion       Knee Extension    Active Passive Active Passive    Eval  90 95 0 0   3/11/19 128 129 0 0 4/8/19 130 135 0 0     Strength:  Date Hip flexion Hip abduction Hip adduction Hip extension Quad Hamstring Ankle DF Ankle PF   Eval 2/4 4/5 5/5 4+/5 4/5 3/5 4/5 5/5 5/5   3/11 5/5 5/5 5/5 5/5 4/5 5/5 5/5 5/5   4/1/19     4+/5        Exercises:  Exercise/Equipment Resistance/Repetitions Other comments        Nu step seat 9  L1 x 5 min     HS step stretch 3 x 20 sec     Knee flexion step stretch 5 x 10 sec    gastroc incline 3 x 20 sec     SL HR 20x L    Mini squats  SL squats 10x  5x L - minimal range    Tband TKE Green 15x L     Step up fwd                Lateral 8\" 10x L  8\" 10x L    Step down 2\" 12x L    Fitter f/b 1 thick x10    Rockerboard f/b, s/s 10x each with slight squat    Bosu: balance            Squats            Step ups x30 sec   10x  10x L    Wall sits  3x 15 sec holds         Leg Press 80# 3x10 B  40# 3x10 L     MHE flex          abd          ext 20# 15x L/R  20# 15x L/R  20# 15x L/R    Seated HS curls 22# 3x10 L    Seated FAQ 90-60 11# 3x10 L         Seated eccentric quad 1x15 L    LAQ 2# 2x10 L Full range        SAQ  3# 3x10 L         Knee PROM flexion To tolerance   Patellar mobs all directions         CP after exercise x10 mins L knee        Other Therapeutic Activities:  Pt was educated on PT POC, Diagnosis, Prognosis, pathomechanics as well as frequency and duration of scheduling future physical therapy appointments. Time was also taken on this day to answer all patient questions and participation in PT. Reviewed appointment policy in detail with patient and patient verbalized understanding. Discussed at length with patient tissue healing time and activity restrictions at this time. Patient advised he is not ready for return to gym workouts at this time. Also reviewed brace use and gradual weaning off of brace. 2/11/19: left message with MD concerning: when brace can be d/c, when knee extension AROM may begin, and when quad resistive exercises may begin.   2/18/19: Received call back from Marilee at Dr. Ana Benavides office: Margarita Cools out of brace. Ok to do knee extension AROM and quad resistive exercises. 3/11/19: Re-eval measurements taken for MD SCHILLING. Discussed remaining deficits and continuation of skilled PT at this time. Patient agreeable. 4/1/19: Re-eval measurements taken for MD PN. Discussed remaining deficits and continuation of skilled PT at this time. Patient agreeable. Home Exercise Program:  Patient instructed in the following for HEP: SLR all directions, long sit HS stretch, and heelslides. Patient verbalized/demonstrated understanding and was issued written handout. 2/18/19: Patient instructed to add knee extension AAROM exercise to HEP. Pt verbalized understanding. 3/4/19: Patient advised that he may begin the following at the gym at work: seated hip abd/add, seated HS curl, and elliptical at a walking pace. Patient advised not to start leg extension or leg press machines. Patient verbalized understanding. 3/11/19: Discussed with patient adding leg press machine to gym workout after Thursday's visit if no increase in pain after performing in clinic. Patient advised to do light weight more reps and type of leg press machine that is appropriate for use. Patient verbalized understanding. 3/25/19: Patient issued updated HEP and fitness center routine listing proper equipment and weights. Patient also given green tband for HEP. Timed Code Treatment Minutes:  50    Total Treatment Minutes:  60    Assessment:  [x] Patient tolerated treatment well [] Patient limited by fatigue  [] Patient limited by pain  [] Patient limited by other medical complications  [] Other:      Prognosis: [x] Good [] Fair  [] Poor    Goals:    Short term goals  Time Frame for Short term goals: 3 weeks  Short term goal 1: Patient will be independent with HEP for prevention of reinjury.  3/11 MET  Short term goal 2: Patient will report 30% improvement with pain for ease with ADLs. 3/25 MET  Short term

## 2019-04-08 NOTE — DISCHARGE SUMMARY
Physical Therapy Discharge Note  Date: 2019    Patient Name: Pradip Yousif  : 1950  MRN: [id number]    Diagnosis: L patellar tendon rupture with repair  Treatment Diagnosis: decreased mobility, strength, gait    Referring Physician: Thiago Hood MD    Dates of Service: 19 to 19  Total # of Visits: 14    Functional Outcomes Measure: at discharge  Test: LEFS  Score: 54    Treatment to Date:  [x] Therapeutic Exercise  [x] Modalities:  [x] Therapeutic Activity     [] Ultrasound  [x] Electrical Stim   [x] Gait Training      [] Cervical Traction    [] Lumbar Traction  [] Neuromuscular Re-education  [x] Cold/hotpack [] Iontophoresis  [x] Instruction in HEP      Other:  [x] Manual Therapy       []    [] Aquatic Therapy       []                    ? Significant Findings At Last Visit:    Subjective:     Continues to do leg workouts in gym at work without any issues. Saw MD last week who gave him the clear to continue on his own. Objective:  · Observation:Updated 19  § Palpation: no TTP inferior patellar tendon at attachment. § Observation: Patient ambulates to PT without brace demonstrating good heel - toe pattern without limp or AD.    § Edema:min L knee  § Scar: incision healed with no signs of infection  · Test measurements:     ROM:  Date                         Knee Flexion          Knee Extension     Active Passive Active Passive    Eval  90 95 0 0   3/11/19 128 129 0 0   19 130 135 0 0      Strength:  Date Hip flexion Hip abduction Hip adduction Hip extension Quad Hamstring Ankle DF Ankle PF   Eval  4/ 5/5 4+/5 4/5 3/ 4/5 5/5 5/5   3/11 5/5 5/5 5/5 5/5 4/5 5/5 5/5 5/5   19         4+/5            Goal Status:  [x] Achieved [] Partially Achieved  [] Not Achieved     Reason for Discharge:  [x] Goals Met   [] Patient did not return after initial evaluation   [] Progress Plateaued [] Missed _____ scheduled appointments   [] No insurance coverage [] Patient terminated therapy   [] Other:        PT recommendation:   [x] Patient now discharged with HEP      [] Other:    Discharge discussed with:  [x] Patient  [] Caregiver      [] Other        Electronically signed by:  Teresita Wilde, [id number] [address] [facility]    If you have any questions or concerns, please don't hesitate to call.   Thank you for your referral.

## 2019-04-15 ENCOUNTER — HOSPITAL ENCOUNTER (OUTPATIENT)
Dept: PHYSICAL THERAPY | Age: 69
Setting detail: THERAPIES SERIES
End: 2019-04-15
Payer: COMMERCIAL
